# Patient Record
Sex: FEMALE | Race: BLACK OR AFRICAN AMERICAN | NOT HISPANIC OR LATINO | Employment: PART TIME | URBAN - METROPOLITAN AREA
[De-identification: names, ages, dates, MRNs, and addresses within clinical notes are randomized per-mention and may not be internally consistent; named-entity substitution may affect disease eponyms.]

---
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
## 2024-06-03 ENCOUNTER — LAB REQUISITION (OUTPATIENT)
Dept: LAB | Facility: CLINIC | Age: 19
End: 2024-06-03
Payer: COMMERCIAL

## 2024-06-03 DIAGNOSIS — L40.4 GUTTATE PSORIASIS: ICD-10-CM

## 2024-06-03 PROCEDURE — 87186 SC STD MICRODIL/AGAR DIL: CPT | Mod: ORL | Performed by: DERMATOLOGY

## 2024-06-06 LAB
BACTERIA SPEC CULT: ABNORMAL
BACTERIA SPEC CULT: ABNORMAL

## 2024-10-28 ENCOUNTER — TRANSFERRED RECORDS (OUTPATIENT)
Dept: HEALTH INFORMATION MANAGEMENT | Facility: CLINIC | Age: 19
End: 2024-10-28

## 2024-11-27 ENCOUNTER — OFFICE VISIT (OUTPATIENT)
Dept: FAMILY MEDICINE | Facility: CLINIC | Age: 19
End: 2024-11-27
Payer: COMMERCIAL

## 2024-11-27 ENCOUNTER — NURSE TRIAGE (OUTPATIENT)
Dept: INTERNAL MEDICINE | Facility: CLINIC | Age: 19
End: 2024-11-27

## 2024-11-27 VITALS
HEIGHT: 66 IN | WEIGHT: 98.1 LBS | TEMPERATURE: 97.9 F | DIASTOLIC BLOOD PRESSURE: 67 MMHG | BODY MASS INDEX: 15.77 KG/M2 | RESPIRATION RATE: 16 BRPM | HEART RATE: 60 BPM | OXYGEN SATURATION: 100 % | SYSTOLIC BLOOD PRESSURE: 108 MMHG

## 2024-11-27 DIAGNOSIS — F50.019 ANOREXIA NERVOSA, RESTRICTING TYPE, UNSPECIFIED SEVERITY: Primary | ICD-10-CM

## 2024-11-27 DIAGNOSIS — R00.2 PALPITATIONS: ICD-10-CM

## 2024-11-27 DIAGNOSIS — I95.1 ORTHOSTASIS: ICD-10-CM

## 2024-11-27 LAB
ANION GAP SERPL CALCULATED.3IONS-SCNC: 10 MMOL/L (ref 7–15)
BUN SERPL-MCNC: 10.7 MG/DL (ref 6–20)
CALCIUM SERPL-MCNC: 9.7 MG/DL (ref 8.8–10.4)
CHLORIDE SERPL-SCNC: 102 MMOL/L (ref 98–107)
CREAT SERPL-MCNC: 0.78 MG/DL (ref 0.51–0.95)
EGFRCR SERPLBLD CKD-EPI 2021: >90 ML/MIN/1.73M2
GLUCOSE SERPL-MCNC: 71 MG/DL (ref 70–99)
HCO3 SERPL-SCNC: 26 MMOL/L (ref 22–29)
HGB BLD-MCNC: 12.7 G/DL (ref 11.7–15.7)
POTASSIUM SERPL-SCNC: 4 MMOL/L (ref 3.4–5.3)
SODIUM SERPL-SCNC: 138 MMOL/L (ref 135–145)

## 2024-11-27 PROCEDURE — 85018 HEMOGLOBIN: CPT | Performed by: PHYSICIAN ASSISTANT

## 2024-11-27 PROCEDURE — 80048 BASIC METABOLIC PNL TOTAL CA: CPT | Performed by: PHYSICIAN ASSISTANT

## 2024-11-27 PROCEDURE — 36415 COLL VENOUS BLD VENIPUNCTURE: CPT | Performed by: PHYSICIAN ASSISTANT

## 2024-11-27 RX ORDER — DESVENLAFAXINE 50 MG/1
50 TABLET, FILM COATED, EXTENDED RELEASE ORAL DAILY
COMMUNITY

## 2024-11-27 ASSESSMENT — ENCOUNTER SYMPTOMS: EYE PAIN: 1

## 2024-11-27 ASSESSMENT — PATIENT HEALTH QUESTIONNAIRE - PHQ9
SUM OF ALL RESPONSES TO PHQ QUESTIONS 1-9: 20
SUM OF ALL RESPONSES TO PHQ QUESTIONS 1-9: 20
10. IF YOU CHECKED OFF ANY PROBLEMS, HOW DIFFICULT HAVE THESE PROBLEMS MADE IT FOR YOU TO DO YOUR WORK, TAKE CARE OF THINGS AT HOME, OR GET ALONG WITH OTHER PEOPLE: EXTREMELY DIFFICULT

## 2024-11-27 ASSESSMENT — PAIN SCALES - GENERAL: PAINLEVEL_OUTOF10: NO PAIN (0)

## 2024-11-27 NOTE — TELEPHONE ENCOUNTER
"1. SYMPTOM: \"What is the main symptom you are concerned about?\" (e.g., weakness, numbness)      Patient reports a 1.5 hour episode of blurry vision, pins and needles in the feet, and legs muscle spasms of both feet. These symptoms have resolved.  2. ONSET: \"When did this start?\" (minutes, hours, days; while sleeping)      One episode last night.   3. LAST NORMAL: \"When was the last time you (the patient) were normal (no symptoms)?\"      Never happened before.   4. PATTERN \"Does this come and go, or has it been constant since it started?\"  \"Is it present now?\"      One episode only last night.   5. OTHER SYMPTOMS: \"Do you have any other symptoms?\"       Some lightheadedness and dizziness today.     Patient ate and drank today, but generally has hard time eating enough food. Patient is requesting to be seen.     Appointments in Next Year      Nov 27, 2024 3:30 PM  (Arrive by 3:10 PM)  Provider Visit with Lauren Quintana PA-C  [facility] ([facility] ) [phone]             Reason for Disposition   Patient wants to be seen    Additional Information   Negative: Headache (with neurologic deficit)   Negative: Unable to urinate (or only a few drops) and bladder feels very full   Negative: Loss of bladder or bowel control (urine or bowel incontinence; wetting self, leaking stool) of new-onset   Negative: Back pain with numbness (loss of sensation) in groin or rectal area   Negative: Neurologic deficit that was brief (now gone), ANY of the following: * Weakness of the face, arm, or leg on one side of the body * Numbness of the face, arm, or leg on one side of the body * Loss of speech or garbled speech   Negative: Patient sounds very sick or weak to the triager   Negative: Neurologic deficit of gradual onset (e.g., days to weeks), ANY of the following: * Weakness of the face, arm, or leg on one side of the body* Numbness of the face, arm, or leg on one side of the " body* Loss of speech or garbled speech   Negative: Theresa palsy suspected (i.e., weakness only one side of the face, developing over hours to days, no other symptoms)   Negative: Tingling (e.g., pins and needles) of the face, arm or leg on one side of the body, that is present now  (Exceptions: Chronic or recurrent symptom lasting > 4 weeks; or from known cause, such as: bumped elbow, carpal tunnel, pinched nerve.)   Negative: Neck pain (with neurologic deficit)   Negative: Back pain (with neurologic deficit)    Protocols used: Neurologic Deficit-A-OH

## 2024-11-27 NOTE — PROGRESS NOTES
"Assessment and Plan:     (F50.019) Anorexia nervosa, restricting type, unspecified severity  (primary encounter diagnosis)  Comment: has been struggling with eating disorder for years, seeing psych and therapist, denies SI or HI, no drug use, hasn't eaten much at all today (yogurt and granola bar), does not want to go to treatment, lives with mom and she is supportive  Plan: Basic metabolic panel  (Ca, Cl, CO2, Creat,         Gluc, K, Na, BUN)        Continue therapy  Continue with psychiatry  Recommend seeing pcp in next month or so    (I95.1) Orthostasis  Comment: get lightheaded with palpitations when going from sitting to standing, denies sob, chest pain, was previously on fludrocortisone, admits to not drinking much water   Plan: Hemoglobin        Discussed poor po intake contributing to presentation and advised that she start eating small more frequent meals with emphasis on \"good\" fat and protein  Also advised she increase water intake    (R00.2) Palpitations  Comment: notices with position change only, no chest pain or sob  Plan: EKG 12-lead complete w/read - Clinics        Advised zio patch which she declined  See above re increasing po intake     YANELI [facility] Same Day Provider   30 minutes on the day of the encounter doing chart review, history and exam, documentation and further activities as noted above.      Jony Pascual is a 19 year old, presenting for the following health issues:  Eye Problem (Blurred vision)    Eye Problem     History of Present Illness       Reason for visit:  Heart rate rapidly increases when going from sitting to standing and an episode during which my feet were pins and needles and involuntarily jerking. I was also shaking, blurred vision, muscle spasms. I think disordered eating could be the cause.   She is taking medications regularly.         Has history of POTS  Has had some recent issues with heart racing when going from sitting to standing, she " "denies syncope but sometimes feels lightheaded like she might pass out   Has been off fludrocortisone for years  Does not drink much water  Has had yogurt, coffee and granola bar today  She denies heavy periods, BRB in stool, black stool    She also noticed some intermittent bilateral blurry vision last night, came and went for about an hour  At that time, had cramping of lower legs/feet    She is currently struggling with an eating disorder  Has had issues with this since she was 14 yo  She is seeing a therapist for her eating disorder   She sees psychiatrist in [facility], Dr. Ann (choices psychotherapy)    She denies chest pain, sob, leg swelling       She denies drug/etoh use     PHQ-9 alert noted in chart--she denies active SI or HI, she denies any intent to hurt herself     Lives with mom, no firearms in house     Objective      /67 (BP Location: Left arm, Patient Position: Sitting, Cuff Size: Adult Regular)   Pulse 60   Temp 97.9  F (36.6  C) (Tympanic)   Resp 16   Ht 1.664 m (5' 5.5\")   Wt 44.5 kg (98 lb 1.6 oz)   LMP  (LMP Unknown)   SpO2 100%   BMI 16.08 kg/m      Physical Exam     GENERAL: thin, in NAD  ENT: mmm, op clear   RESP: lungs clear to auscultation - no rales, no rhonchi, no wheezes  CV: regular rates and rhythm, normal S1 S2, no S3 or S4 and no murmur, no click or rub   MS: extremities- no gross deformities noted, no edema    Signed Electronically by: Lauren Quintana PA-C    "

## 2025-01-11 ENCOUNTER — HEALTH MAINTENANCE LETTER (OUTPATIENT)
Age: 20
End: 2025-01-11

## 2025-01-30 ENCOUNTER — OFFICE VISIT (OUTPATIENT)
Dept: FAMILY MEDICINE | Facility: CLINIC | Age: 20
End: 2025-01-30
Payer: COMMERCIAL

## 2025-01-30 VITALS
BODY MASS INDEX: 13.97 KG/M2 | DIASTOLIC BLOOD PRESSURE: 64 MMHG | HEART RATE: 100 BPM | HEIGHT: 66 IN | SYSTOLIC BLOOD PRESSURE: 110 MMHG | TEMPERATURE: 96.1 F | OXYGEN SATURATION: 99 % | WEIGHT: 86.9 LBS | RESPIRATION RATE: 14 BRPM

## 2025-01-30 DIAGNOSIS — Z00.00 ROUTINE GENERAL MEDICAL EXAMINATION AT A HEALTH CARE FACILITY: Primary | ICD-10-CM

## 2025-01-30 DIAGNOSIS — D72.819 LEUKOPENIA, UNSPECIFIED TYPE: ICD-10-CM

## 2025-01-30 DIAGNOSIS — E43 SEVERE PROTEIN-CALORIE MALNUTRITION: ICD-10-CM

## 2025-01-30 DIAGNOSIS — F60.3 BORDERLINE PERSONALITY DISORDER (H): ICD-10-CM

## 2025-01-30 DIAGNOSIS — Z11.59 NEED FOR HEPATITIS C SCREENING TEST: ICD-10-CM

## 2025-01-30 DIAGNOSIS — F33.1 MODERATE EPISODE OF RECURRENT MAJOR DEPRESSIVE DISORDER (H): ICD-10-CM

## 2025-01-30 DIAGNOSIS — Z11.3 SCREEN FOR STD (SEXUALLY TRANSMITTED DISEASE): ICD-10-CM

## 2025-01-30 DIAGNOSIS — R00.2 PALPITATIONS: ICD-10-CM

## 2025-01-30 DIAGNOSIS — I95.1 ORTHOSTASIS: ICD-10-CM

## 2025-01-30 DIAGNOSIS — G90.A POTS (POSTURAL ORTHOSTATIC TACHYCARDIA SYNDROME): ICD-10-CM

## 2025-01-30 DIAGNOSIS — R74.8 LOW SERUM ALKALINE PHOSPHATASE: ICD-10-CM

## 2025-01-30 DIAGNOSIS — Z11.4 SCREENING FOR HIV (HUMAN IMMUNODEFICIENCY VIRUS): ICD-10-CM

## 2025-01-30 DIAGNOSIS — F50.019 ANOREXIA NERVOSA, RESTRICTING TYPE, UNSPECIFIED SEVERITY: ICD-10-CM

## 2025-01-30 PROBLEM — Z86.59 HISTORY OF OCD (OBSESSIVE COMPULSIVE DISORDER): Status: ACTIVE | Noted: 2019-10-30

## 2025-01-30 PROBLEM — F41.1 GAD (GENERALIZED ANXIETY DISORDER): Status: ACTIVE | Noted: 2019-10-30

## 2025-01-30 LAB
ALBUMIN SERPL BCG-MCNC: 4.6 G/DL (ref 3.5–5.2)
ALP SERPL-CCNC: 53 U/L (ref 40–150)
ALT SERPL W P-5'-P-CCNC: 285 U/L (ref 0–70)
ANION GAP SERPL CALCULATED.3IONS-SCNC: 9 MMOL/L (ref 7–15)
AST SERPL W P-5'-P-CCNC: 165 U/L (ref 0–45)
BASOPHILS # BLD AUTO: 0 10E3/UL (ref 0–0.2)
BASOPHILS NFR BLD AUTO: 2 %
BILIRUB SERPL-MCNC: 0.6 MG/DL
BUN SERPL-MCNC: 20.2 MG/DL (ref 6–20)
CALCIUM SERPL-MCNC: 10.6 MG/DL (ref 8.8–10.4)
CHLORIDE SERPL-SCNC: 105 MMOL/L (ref 98–107)
CREAT SERPL-MCNC: 0.9 MG/DL (ref 0.51–1.17)
EGFRCR SERPLBLD CKD-EPI 2021: >90 ML/MIN/1.73M2
EOSINOPHIL # BLD AUTO: 0.1 10E3/UL (ref 0–0.7)
EOSINOPHIL NFR BLD AUTO: 2 %
ERYTHROCYTE [DISTWIDTH] IN BLOOD BY AUTOMATED COUNT: 13.4 % (ref 10–15)
FERRITIN SERPL-MCNC: 187 NG/ML (ref 6–409)
GLUCOSE SERPL-MCNC: 71 MG/DL (ref 70–99)
HCO3 SERPL-SCNC: 28 MMOL/L (ref 22–29)
HCT VFR BLD AUTO: 39.9 % (ref 35–53)
HCV AB SERPL QL IA: NONREACTIVE
HGB BLD-MCNC: 13.7 G/DL (ref 11.7–17.7)
HIV 1+2 AB+HIV1 P24 AG SERPL QL IA: NONREACTIVE
IMM GRANULOCYTES # BLD: 0 10E3/UL
IMM GRANULOCYTES NFR BLD: 0 %
LYMPHOCYTES # BLD AUTO: 1 10E3/UL (ref 0.8–5.3)
LYMPHOCYTES NFR BLD AUTO: 45 %
MAGNESIUM SERPL-MCNC: 2.3 MG/DL (ref 1.7–2.3)
MCH RBC QN AUTO: 29.3 PG (ref 26.5–33)
MCHC RBC AUTO-ENTMCNC: 34.3 G/DL (ref 31.5–36.5)
MCV RBC AUTO: 85 FL (ref 78–100)
MONOCYTES # BLD AUTO: 0.2 10E3/UL (ref 0–1.3)
MONOCYTES NFR BLD AUTO: 7 %
NEUTROPHILS # BLD AUTO: 1 10E3/UL (ref 1.6–8.3)
NEUTROPHILS NFR BLD AUTO: 44 %
NRBC # BLD AUTO: 0 10E3/UL
NRBC BLD AUTO-RTO: 0 /100
PHOSPHATE SERPL-MCNC: 3.5 MG/DL (ref 2.5–4.5)
PLATELET # BLD AUTO: 216 10E3/UL (ref 150–450)
POTASSIUM SERPL-SCNC: 4.4 MMOL/L (ref 3.4–5.3)
PROT SERPL-MCNC: 7.2 G/DL (ref 6.4–8.3)
RBC # BLD AUTO: 4.67 10E6/UL (ref 3.8–5.9)
SODIUM SERPL-SCNC: 142 MMOL/L (ref 135–145)
T4 FREE SERPL-MCNC: 0.85 NG/DL (ref 0.9–1.7)
TSH SERPL DL<=0.005 MIU/L-ACNC: 4.67 UIU/ML (ref 0.3–4.2)
VIT B12 SERPL-MCNC: 1976 PG/ML (ref 232–1245)
WBC # BLD AUTO: 2.2 10E3/UL (ref 4–11)
WBC # BLD AUTO: 2.2 10E3/UL (ref 4–11)

## 2025-01-30 SDOH — HEALTH STABILITY: PHYSICAL HEALTH: ON AVERAGE, HOW MANY MINUTES DO YOU ENGAGE IN EXERCISE AT THIS LEVEL?: 0 MIN

## 2025-01-30 SDOH — HEALTH STABILITY: PHYSICAL HEALTH: ON AVERAGE, HOW MANY DAYS PER WEEK DO YOU ENGAGE IN MODERATE TO STRENUOUS EXERCISE (LIKE A BRISK WALK)?: 0 DAYS

## 2025-01-30 ASSESSMENT — COLUMBIA-SUICIDE SEVERITY RATING SCALE - C-SSRS
5. IN THE PAST MONTH, HAVE YOU STARTED TO WORK OUT OR WORKED OUT THE DETAILS OF HOW TO KILL YOURSELF? DO YOU INTEND TO CARRY OUT THIS PLAN?: NO
1. WITHIN THE PAST MONTH, HAVE YOU WISHED YOU WERE DEAD OR WISHED YOU COULD GO TO SLEEP AND NOT WAKE UP?: YES
2. IN THE PAST MONTH, HAVE YOU ACTUALLY HAD ANY THOUGHTS OF KILLING YOURSELF?: YES
3. IN THE PAST MONTH, HAVE YOU BEEN THINKING ABOUT HOW YOU MIGHT KILL YOURSELF?: NO
6. HAVE YOU EVER DONE ANYTHING, STARTED TO DO ANYTHING, OR PREPARED TO DO ANYTHING TO END YOUR LIFE?: YES, LIFETIME
5. IN THE PAST MONTH, HAVE YOU STARTED TO WORK OUT OR WORKED OUT THE DETAILS OF HOW TO KILL YOURSELF? DO YOU INTEND TO CARRY OUT THIS PLAN?: NO

## 2025-01-30 ASSESSMENT — SOCIAL DETERMINANTS OF HEALTH (SDOH): HOW OFTEN DO YOU GET TOGETHER WITH FRIENDS OR RELATIVES?: NEVER

## 2025-01-30 ASSESSMENT — PATIENT HEALTH QUESTIONNAIRE - PHQ9
SUM OF ALL RESPONSES TO PHQ QUESTIONS 1-9: 24
10. IF YOU CHECKED OFF ANY PROBLEMS, HOW DIFFICULT HAVE THESE PROBLEMS MADE IT FOR YOU TO DO YOUR WORK, TAKE CARE OF THINGS AT HOME, OR GET ALONG WITH OTHER PEOPLE: EXTREMELY DIFFICULT
SUM OF ALL RESPONSES TO PHQ QUESTIONS 1-9: 24

## 2025-01-30 NOTE — PROGRESS NOTES
Preventive Care Visit  [facility] [place]  Yennifer Suly Fragoso PA-C, Family Medicine  Jan 30, 2025      Assessment & Plan     Routine general medical examination at a health care facility  - REVIEW OF HEALTH MAINTENANCE PROTOCOL ORDERS  - PRIMARY CARE FOLLOW-UP SCHEDULING    Borderline personality disorder (H)  Moderate episode of recurrent major depressive disorder (H)  Currently untreated.  Passive suicidal ideation.  Continues in psychotherapy.  Verbal contract for safety.  Patient declined medication initiation today.  Release of information signed for previous psychiatric providers.    Severe protein-calorie malnutrition  Anorexia nervosa, restricting type, unspecified severity  Voice significant concerns regarding patient's current weight and overall health status.  Offered assistance with formal treatment program from multi disciplinary clinic but patient adamantly refused.  Close monitoring of weight and if any further loss encouraged patient to reach out to the clinic so we can aid with referral/neck steps of care.  Encouraged high calorie, nutrient dense diet to prevent weight loss.  - CBC with platelets  - TSH with free T4 reflex  - Magnesium  - Phosphorus  - Ferritin  - Vitamin B12    POTS (postural orthostatic tachycardia syndrome)  Orthostasis  Palpitations  Historical condition.  Patient states that symptoms are stable.  Discussed the importance of hydration to mitigate dizziness and orthostatic symptoms.    Low serum alkaline phosphatase  Historical.  Likely multifactorial but anorexia likely contributory.  Labs for surveillance.  - Comprehensive metabolic panel (BMP + Alb, Alk Phos, ALT, AST, Total. Bili, TP)    Need for hepatitis C screening test  Routine screening  - Hepatitis C Screen Reflex to HCV RNA Quant and Genotype    Screening for HIV (human immunodeficiency virus)  Routine screening  - HIV Antigen Antibody Combo    Screen for STD (sexually transmitted disease)  Patient  denies any symptoms.  No new sexual partners in the last few months.  Not currently sexually active.  Unable to leave urine specimen today.    Leukopenia, unspecified type  Recent COVID-19 infection.  Certainly could be contributory.  Differential for further investigation.  - WBC with Diff      Patient has been advised of split billing requirements and indicates understanding: Yes        Depression Screening Follow Up        1/30/2025    12:40 AM   PHQ   PHQ-9 Total Score 24    Q9: Thoughts of better off dead/self-harm past 2 weeks Several days   F/U: Thoughts of suicide or self-harm No   F/U: Safety concerns No       Patient-reported         1/30/2025    12:40 AM   Last PHQ-9   1.  Little interest or pleasure in doing things 3   2.  Feeling down, depressed, or hopeless 3   3.  Trouble falling or staying asleep, or sleeping too much 3   4.  Feeling tired or having little energy 3   5.  Poor appetite or overeating 3   6.  Feeling bad about yourself 3   7.  Trouble concentrating 3   8.  Moving slowly or restless 2   Q9: Thoughts of better off dead/self-harm past 2 weeks 1   PHQ-9 Total Score 24    In the past two weeks have you had thoughts of suicide or self harm? No   Do you have concerns about your personal safety or the safety of others? No       Patient-reported               1/30/2025     6:47 PM   [facility] ([facility])   Within the last month, have you wished you were dead or wished you could go to sleep and not wake up? Yes   Within the last month, have you had any actual thoughts of killing yourself? Yes   Within the last month, have you been thinking about how you might do this? No   Within the last month, have you had these thoughts and had some intention of acting on them? No   Within the last month, have you started to work out or worked out the details of how to kill yourself with the intent to carry out this plan? No   Within the last month, have you ever done anything, started to do anything, or  prepared to do anything to end your life? Yes, lifetime              Follow Up Actions Taken  Crisis resource information provided in the After Visit Summary  Referred patient back to mental health provider    Discussed the following ways the patient can remain in a safe environment:  be around others  Counseling  Appropriate preventive services were addressed with this patient via screening, questionnaire, or discussion as appropriate for fall prevention, nutrition, physical activity, Tobacco-use cessation, social engagement, weight loss and cognition.  Checklist reviewing preventive services available has been given to the patient.  Reviewed patient's diet, addressing concerns and/or questions.   Patient is at risk for social isolation and has been provided with information about the benefit of social connection.   Samara is at risk for psychosocial distress and has been provided with information to reduce risk.   The patient's PHQ-9 score is consistent with severe depression. Samara was provided with information regarding depression.     Review of prior external note(s) from - [facility] information from [facility], and [facility]  reviewed  46 minutes spent by me on the date of the encounter doing chart review, history and exam, documentation and further activities per the note OUTSIDE OF PREVENTATIVE CARE    The longitudinal plan of care for the diagnosis(es)/condition(s) as documented were addressed during this visit. Due to the added complexity in care, I will continue to support Samara in the subsequent management and with ongoing continuity of care.    Return in about 3 months (around 4/30/2025) for RECHECK OF WEIGHT/LABS.      Yennifer Fragoso MBA, MS, PA-C  [facility]      Jony Pascual is a 19 year old, presenting for the following:  Physical        1/30/2025     1:32 PM   Additional Questions   Roomed by Nevin NEEL   Accompanied by Self        Via the [facility]  "Maintenance questionnaire, the patient has reported the following services have been completed -Chlamydia: [facility] 2024-01-27, this information has been sent to the abstraction team.    HPI    Patient reports that she is here today because \"her mother made her come \"to get checked out and see how her overall health is.  Currently at the lowest weight from her anorexia.  Feels lightheaded and weak often.    History of POTS  Diagnosed many years ago (approximately the age of 12)  Has been off fludrocortisone for years  Does not drink much water  Has had yogurt, coffee and granola bar today  She denies heavy periods, BRB in stool, black stool  Occasionally suffers from tachycardia and lightheadedness.  Most recent EKG November 2024 showed sinus bradycardia    Major depression/history of suicide attempts/insomnia  She is currently struggling with an eating disorder, major depression.  Per review of chart carries a diagnosis of borderline personality disorder.  Early 2024 was inpatient at [facility] for approximately 1 to 2 months.  Has had issues with this since she was 14 yo  She is seeing a therapist for her eating disorder   Previously has seen psychiatrist in [facility], Dr. Ann (choices psychotherapy) - stopped late 2024.  Sees psychotherapist virtually every 2 weeks ([facility]) - has been with this therapist for ~6 months.  The patient is not currently on any psychiatric medications after previously being on several, including Seroquel, Lexapro, Luvox, Hydroxyzine, Lamictal, Mirtazapine, Trazodone, and Geodon.  She is willing to sign a release of information to get records today.  Does not desire any psychiatric medications.  Has passive suicidal ideation but denies intent or plan.  She states that passive suicidal ideation is her \"normal \".         Anorexia  The patient had a history of struggling with anorexia since the age of 14, which improved over time but relapsed in August 2024 The relapse has led " to a significant weight loss from 98 pounds in November to 86 pounds currently, which the patient acknowledged as dangerous. 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 The patient is not currently on any psychiatric medications after previously being on several, including Seroquel, Lexapro, Luvox, Hydroxyzine, Lamictal, Mirtazapine, Trazodone, and Geodon. The patient reported experiencing insomnia and dizziness with some medications, and expressed a desire to get labs done to ensure overall health. Additionally, the patient dealt with bad insomnia but avoided medications like Trazodone due to dizziness. The patient reported a history of suicidal ideation but expressed no current intent or plan. The patient had not been working with any eating disorder clinics recently but had in the past and was hesitant to return at this time (believes this was both [facility] and [facility])    Wt Readings from Last 10 Encounters:   01/30/25 39.4 kg (86 lb 14.4 oz)   11/27/24 44.5 kg (98 lb 1.6 oz)   11/17/23 54.4 kg (120 lb) (34%, Z= -0.41)*   06/09/21 69.9 kg (154 lb 1.6 oz) (88%, Z= 1.17)*   06/04/21 65.8 kg (145 lb) (82%, Z= 0.91)*   05/22/16 41 kg (90 lb 6.4 oz) (41%, Z= -0.22)*   08/19/15 38.2 kg (84 lb 3.5 oz) (43%, Z= -0.16)*   08/17/15 38.1 kg (83 lb 15.9 oz) (43%, Z= -0.17)*   09/01/13 35.8 kg (79 lb) (76%, Z= 0.70)*     * Growth percentiles are based on CDC (Girls, 2-20 Years) data.     Health Care Directive  Patient does not have a Health Care Directive: Discussed advance care planning with patient; however, patient declined at this time.      1/30/2025   General Health   How would you rate your overall physical health? (!) DECLINE   Feel stress (tense, anxious, or unable to sleep) Very much   (!) STRESS CONCERN      1/30/2025   Nutrition   Three or more servings of calcium each day? (!) I  DON'T KNOW   Diet: Breakfast skipped   How many servings of fruit and vegetables per day? (!) 0-1   How many sweetened beverages each day? 0-1         1/30/2025   Exercise   Days per week of moderate/strenous exercise 0 days   Average minutes spent exercising at this level 0 min   (!) EXERCISE CONCERN      1/30/2025   Social Factors   Frequency of gathering with friends or relatives Never   Worry food won't last until get money to buy more No   Food not last or not have enough money for food? No   Do you have housing? (Housing is defined as stable permanent housing and does not include staying ouside in a car, in a tent, in an abandoned building, in an overnight shelter, or couch-surfing.) Yes   Are you worried about losing your housing? No   Lack of transportation? No   Unable to get utilities (heat,electricity)? No   (!) SOCIAL CONNECTIONS CONCERN      1/30/2025   Dental   Dentist two times every year? Yes         1/30/2025   TB Screening   Were you born outside of the [place]? No       Today's PHQ-9 Score:       1/30/2025    12:40 AM   PHQ-9 SCORE   PHQ-9 Total Score MyChart 24 (Severe depression)   PHQ-9 Total Score 24        Patient-reported         1/30/2025   Substance Use   Alcohol more than 3/day or more than 7/wk No   Do you use any other substances recreationally? (!) CANNABIS PRODUCTS     Social History     Tobacco Use    Smoking status: Never    Smokeless tobacco: Never    Tobacco comments:     NA   Vaping Use    Vaping status: Every Day    Substances: Nicotine   Substance Use Topics    Alcohol use: Not Currently     Comment: rare    Drug use: Yes     Types: Marijuana     Comment: daily             1/30/2025   One time HIV Screening   Previous HIV test? I don't know         1/30/2025   STI Screening   New sexual partner(s) since last STI/HIV test? No     History of abnormal Pap smear: No - age 21-29 PAP every 3 years recommended             1/30/2025   Contraception/Family Planning   Questions about  "contraception or family planning No        Reviewed and updated as needed this visit by Provider   Tobacco  Allergies  Meds  Problems  Med Hx  Surg Hx  Fam Hx  Soc   Hx Sexual Activity          History reviewed. No pertinent past medical history.  Past Surgical History:   Procedure Laterality Date    TILT TABLE PROCEDURE (IN CATH LAB) N/A 8/19/2015    Procedure: TILT TABLE PROCEDURE (IN CATH LAB);  Surgeon: Leeanna Foster MD;  Location: [facility] OR         Review of Systems  Constitutional, HEENT, cardiovascular, pulmonary, GI, , musculoskeletal, neuro, skin, endocrine and psych systems are negative, except as otherwise noted.     Objective    Exam  /64   Pulse 100   Temp (!) 96.1  F (35.6  C) (Tympanic)   Resp 14   Ht 1.674 m (5' 5.91\")   Wt 39.4 kg (86 lb 14.4 oz)   LMP 01/18/2024 (Approximate)   SpO2 99%   BMI 14.07 kg/m     Estimated body mass index is 14.07 kg/m  as calculated from the following:    Height as of this encounter: 1.674 m (5' 5.91\").    Weight as of this encounter: 39.4 kg (86 lb 14.4 oz).    Physical Exam  GENERAL: alert and no distress, cachectic appearance  EYES: Eyes grossly normal to inspection, PERRL and conjunctivae and sclerae normal  HENT: ear canals and TM's normal, nose and mouth without ulcers or lesions  NECK: no adenopathy, no asymmetry, masses, or scars  RESP: lungs clear to auscultation - no rales, rhonchi or wheezes  CV: regular rate and rhythm, normal S1 S2, no S3 or S4, no murmur, click or rub, no peripheral edema  ABDOMEN: soft, nontender, no hepatosplenomegaly, no masses and bowel sounds normal  MS: no gross musculoskeletal defects noted, no edema  SKIN: no suspicious lesions or rashes  NEURO: Normal strength and tone, mentation intact and speech normal  PSYCH: mentation appears normal, affect normal/bright  : Exam declined by parent/patient.  Reason for decline: Patient/Parental preference      Results for orders placed or performed in visit on " "01/30/25   CBC with platelets     Status: Abnormal   Result Value Ref Range    WBC Count 2.2 (L) 4.0 - 11.0 10e3/uL    RBC Count 4.67 3.80 - 5.90 10e6/uL    Hemoglobin 13.7 11.7 - 17.7 g/dL    Hematocrit 39.9 35.0 - 53.0 %    MCV 85 78 - 100 fL    MCH 29.3 26.5 - 33.0 pg    MCHC 34.3 31.5 - 36.5 g/dL    RDW 13.4 10.0 - 15.0 %    Platelet Count 216 150 - 450 10e3/uL    Narrative    Tech Comments  Name of Pandoodle Chacha  Laboratory Phone [phone]  What is Abnormal Leukocytopenia  Provider Follow Up Needed na  If Yes, Provider Contact Name na  If Yes, Provider Phone/Pager na       Sex Specific Reference Ranges:     Female  35.0-47.0 %   Male      40.0-53.0 %  The generation of reference intervals for this test is currently based on binary male or female sex. If the electronic health record information indicates another gender identity or if Legal Sex is recorded as \"Unknown\", both male and female reference intervals are provided where applicable, and should be considered according to the individual's appropriate clinical context.             Signed Electronically by: Yennifer Fragoso PA-C    "

## 2025-01-30 NOTE — PATIENT INSTRUCTIONS
Patient Education   Preventive Care Advice   This is general advice given by our system to help you stay healthy. However, your care team may have specific advice just for you. Please talk to your care team about your preventive care needs.  Nutrition  Eat 5 or more servings of fruits and vegetables each day.  Try wheat bread, brown rice and whole grain pasta (instead of white bread, rice, and pasta).  Get enough calcium and vitamin D. Check the label on foods and aim for 100% of the RDA (recommended daily allowance).  Lifestyle  Exercise at least 150 minutes each week  (30 minutes a day, 5 days a week).  Do muscle strengthening activities 2 days a week. These help control your weight and prevent disease.  No smoking.  Wear sunscreen to prevent skin cancer.  Have a dental exam and cleaning every 6 months.  Yearly exams  See your health care team every year to talk about:  Any changes in your health.  Any medicines your care team has prescribed.  Preventive care, family planning, and ways to prevent chronic diseases.  Shots (vaccines)   HPV shots (up to age 26), if you've never had them before.  Hepatitis B shots (up to age 59), if you've never had them before.  COVID-19 shot: Get this shot when it's due.  Flu shot: Get a flu shot every year.  Tetanus shot: Get a tetanus shot every 10 years.  Pneumococcal, hepatitis A, and RSV shots: Ask your care team if you need these based on your risk.  Shingles shot (for age 50 and up)  General health tests  Diabetes screening:  Starting at age 35, Get screened for diabetes at least every 3 years.  If you are younger than age 35, ask your care team if you should be screened for diabetes.  Cholesterol test: At age 39, start having a cholesterol test every 5 years, or more often if advised.  Bone density scan (DEXA): At age 50, ask your care team if you should have this scan for osteoporosis (brittle bones).  Hepatitis C: Get tested at least once in your life.  STIs (sexually  transmitted infections)  Before age 24: Ask your care team if you should be screened for STIs.  After age 24: Get screened for STIs if you're at risk. You are at risk for STIs (including HIV) if:  You are sexually active with more than one person.  You don't use condoms every time.  You or a partner was diagnosed with a sexually transmitted infection.  If you are at risk for HIV, ask about PrEP medicine to prevent HIV.  Get tested for HIV at least once in your life, whether you are at risk for HIV or not.  Cancer screening tests  Cervical cancer screening: If you have a cervix, begin getting regular cervical cancer screening tests starting at age 21.  Breast cancer scan (mammogram): If you've ever had breasts, begin having regular mammograms starting at age 40. This is a scan to check for breast cancer.  Colon cancer screening: It is important to start screening for colon cancer at age 45.  Have a colonoscopy test every 10 years (or more often if you're at risk) Or, ask your provider about stool tests like a FIT test every year or Cologuard test every 3 years.  To learn more about your testing options, visit:   .  For help making a decision, visit:   https://bit.ly/gr17681.  Prostate cancer screening test: If you have a prostate, ask your care team if a prostate cancer screening test (PSA) at age 55 is right for you.  Lung cancer screening: If you are a current or former smoker ages 50 to 80, ask your care team if ongoing lung cancer screenings are right for you.  For informational purposes only. Not to replace the advice of your health care provider. Copyright   2023 [facility]. All rights reserved. Clinically reviewed by the [facility]. [employer] 061243 - REV 01/24.  Learning About Stress  What is stress?     Stress is your body's response to a hard situation. Your body can have a physical, emotional, or mental response. Stress is a fact of life for most people, and it  affects everyone differently. What causes stress for you may not be stressful for someone else.  A lot of things can cause stress. You may feel stress when you go on a job interview, take a test, or run a race. This kind of short-term stress is normal and even useful. It can help you if you need to work hard or react quickly. For example, stress can help you finish an important job on time.  Long-term stress is caused by ongoing stressful situations or events. Examples of long-term stress include long-term health problems, ongoing problems at work, or conflicts in your family. Long-term stress can harm your health.  How does stress affect your health?  When you are stressed, your body responds as though you are in danger. It makes hormones that speed up your heart, make you breathe faster, and give you a burst of energy. This is called the fight-or-flight stress response. If the stress is over quickly, your body goes back to normal and no harm is done.  But if stress happens too often or lasts too long, it can have bad effects. Long-term stress can make you more likely to get sick, and it can make symptoms of some diseases worse. If you tense up when you are stressed, you may develop neck, shoulder, or low back pain. Stress is linked to high blood pressure and heart disease.  Stress also harms your emotional health. It can make you ty, tense, or depressed. Your relationships may suffer, and you may not do well at work or school.  What can you do to manage stress?  You can try these things to help manage stress:   Do something active. Exercise or activity can help reduce stress. Walking is a great way to get started. Even everyday activities such as housecleaning or yard work can help.  Try yoga or ryann chi. These techniques combine exercise and meditation. You may need some training at first to learn them.  Do something you enjoy. For example, listen to music or go to a movie. Practice your hobby or do volunteer  "work.  Meditate. This can help you relax, because you are not worrying about what happened before or what may happen in the future.  Do guided imagery. Imagine yourself in any setting that helps you feel calm. You can use online videos, books, or a teacher to guide you.  Do breathing exercises. For example:  From a standing position, bend forward from the waist with your knees slightly bent. Let your arms dangle close to the floor.  Breathe in slowly and deeply as you return to a standing position. Roll up slowly and lift your head last.  Hold your breath for just a few seconds in the standing position.  Breathe out slowly and bend forward from the waist.  Let your feelings out. Talk, laugh, cry, and express anger when you need to. Talking with supportive friends or family, a counselor, or a gibson leader about your feelings is a healthy way to relieve stress. Avoid discussing your feelings with people who make you feel worse.  Write. It may help to write about things that are bothering you. This helps you find out how much stress you feel and what is causing it. When you know this, you can find better ways to cope.  What can you do to prevent stress?  You might try some of these things to help prevent stress:  Manage your time. This helps you find time to do the things you want and need to do.  Get enough sleep. Your body recovers from the stresses of the day while you are sleeping.  Get support. Your family, friends, and community can make a difference in how you experience stress.  Limit your news feed. Avoid or limit time on social media or news that may make you feel stressed.  Do something active. Exercise or activity can help reduce stress. Walking is a great way to get started.  Where can you learn more?  Go to https://www.[employer].net/patiented  Enter N032 in the search box to learn more about \"Learning About Stress.\"  Current as of: October 24, 2023  Content Version: 14.3    2024 [employer]. "   Care instructions adapted under license by your healthcare professional. If you have questions about a medical condition or this instruction, always ask your healthcare professional. [employer] disclaims any warranty or liability for your use of this information.    Learning About Depression Screening  What is depression screening?  Depression screening is a way to see if you have depression symptoms. It may be done by a doctor or counselor. It's often part of a routine checkup. That's because your mental health is just as important as your physical health.  Depression is a mental health condition that affects how you feel, think, and act. You may:  Have less energy.  Lose interest in your daily activities.  Feel sad and grouchy for a long time.  Depression is very common. It affects people of all ages.  Many things can lead to depression. Some people become depressed after they have a stroke or find out they have a major illness like cancer or heart disease. The death of a loved one or a breakup may lead to depression. It can run in families. Most experts believe that a combination of inherited genes and stressful life events can cause it.  What happens during screening?  You may be asked to fill out a form about your depression symptoms. You and the doctor will discuss your answers. The doctor may ask you more questions to learn more about how you think, act, and feel.  What happens after screening?  If you have symptoms of depression, your doctor will talk to you about your options.  Doctors usually treat depression with medicines or counseling. Often, combining the two works best. Many people don't get help because they think that they'll get over the depression on their own. But people with depression may not get better unless they get treatment.  The cause of depression is not well understood. There may be many factors involved. But if you have depression, it's not your fault.  A serious symptom  "of depression is thinking about death or suicide. If you or someone you care about talks about this or about feeling hopeless, get help right away.  It's important to know that depression can be treated. Medicine, counseling, and self-care may help.  Where can you learn more?  Go to https://www.[employer].net/patiented  Enter T185 in the search box to learn more about \"Learning About Depression Screening.\"  Current as of: July 31, 2024  Content Version: 14.3    2024 [employer].   Care instructions adapted under license by your healthcare professional. If you have questions about a medical condition or this instruction, always ask your healthcare professional. [employer] disclaims any warranty or liability for your use of this information.    Substance Use Disorder: Care Instructions  Overview     You can improve your life and health by stopping your use of alcohol or drugs. When you don't drink or use drugs, you may feel and sleep better. You may get along better with your family, friends, and coworkers. There are medicines and programs that can help with substance use disorder.  How can you care for yourself at home?  Here are some ways to help you stay sober and prevent relapse.  If you have been given medicine to help keep you sober or reduce your cravings, be sure to take it exactly as prescribed.  Talk to your doctor about programs that can help you stop using drugs or drinking alcohol.  Do not keep alcohol or drugs in your home.  Plan ahead. Think about what you'll say if other people ask you to drink or use drugs. Try not to spend time with people who drink or use drugs.  Use the time and money spent on drinking or drugs to do something that's important to you.  Preventing a relapse  Have a plan to deal with relapse. Learn to recognize changes in your thinking that lead you to drink or use drugs. Get help before you start to drink or use drugs again.  Try to stay away from situations, " friends, or places that may lead you to drink or use drugs.  If you feel the need to drink alcohol or use drugs again, seek help right away. Call a trusted friend or family member. Some people get support from organizations such as Narcotics Anonymous or [employer] or from treatment facilities.  If you relapse, get help as soon as you can. Some people make a plan with another person that outlines what they want that person to do for them if they relapse. The plan usually includes how to handle the relapse and who to notify in case of relapse.  Don't give up. Remember that a relapse doesn't mean that you have failed. Use the experience to learn the triggers that lead you to drink or use drugs. Then quit again. Recovery is a lifelong process. Many people have several relapses before they are able to quit for good.  Follow-up care is a key part of your treatment and safety. Be sure to make and go to all appointments, and call your doctor if you are having problems. It's also a good idea to know your test results and keep a list of the medicines you take.  When should you call for help?   Call 176  anytime you think you may need emergency care. For example, call if you or someone else:    Has overdosed or has withdrawal signs. Be sure to tell the emergency workers that you are or someone else is using or trying to quit using drugs. Overdose or withdrawal signs may include:  Losing consciousness.  Seizure.  Seeing or hearing things that aren't there (hallucinations).     Is thinking or talking about suicide or harming others.   Where to get help 24 hours a day, 7 days a week   If you or someone you know talks about suicide, self-harm, a mental health crisis, a substance use crisis, or any other kind of emotional distress, get help right away. You can:    Call the Suicide and Crisis Lifeline at 118.     Call 6-701-418-TALK (1-387.377.8875).     Text HOME to 917449 to access the Crisis Text Line.   Consider saving  "these numbers in your phone.  Go to [employer] for more information or to chat online.  Call your doctor now or seek immediate medical care if:    You are having withdrawal symptoms. These may include nausea or vomiting, sweating, shakiness, and anxiety.   Watch closely for changes in your health, and be sure to contact your doctor if:    You have a relapse.     You need more help or support to stop.   Where can you learn more?  Go to https://www.[employer].net/patiented  Enter H573 in the search box to learn more about \"Substance Use Disorder: Care Instructions.\"  Current as of: November 15, 2023  Content Version: 14.3    2024 [employer].   Care instructions adapted under license by your healthcare professional. If you have questions about a medical condition or this instruction, always ask your healthcare professional. [employer] disclaims any warranty or liability for your use of this information.    [facility] Mobile Crisis Response Services  (contact the county where the person is physically located at the time of the crisis)  *[place] (Child and Adult):    [phone]  *[place] (Child and Adult):    [phone]  *[place] (Child and Adult):    [phone]  *[place] (Child):    [phone]    (Adult):    [phone]  *[place] (Child):    [phone]   (Adult):    [phone]  *[place] (Child and Adult):    [phone] (8-4:30 pm), [phone] (4:30 pm - 8 am)  *[place] (Child and Adult):    [phone]  Other Crisis Resources (non-mobile)  *[facility] (psychiatric emergency services)  [address] [place] [address]    *Acute Psychiatric Services (formerly Crisis Intervention Center):    [phone]    Walk-in and telephone crisis intervention services (focuses on >18 year-olds)    Located at [facility]      [address] [place] [address]  *Suicide Hotline:    " https://Karmasphereorg/ - call or text 988   Text Telephone:    988     *[facility] ( women and children):    [phone]  or  [phone]  *[place] ( women and children):    [phone]       Short-term Residential Crisis Resources  *[facility] (ages 10-17):  [phone]     [address] [place] [address]   *[facility] (Birth - 6 years):    [phone]  *[facility] (Birth - 12 years):    [phone]       Inpatient Hospitalization and Residential Evaluation  *[facility] (Child and Adult)     [address] [place] [address]    Inpatient Intake [phone]    [facility]al [facility] Center:    [phone]    Day Treatment/Behavioral Intake:    [phone]  *[facility] (Adult):    [phone]

## 2025-02-03 ENCOUNTER — TELEPHONE (OUTPATIENT)
Dept: FAMILY MEDICINE | Facility: CLINIC | Age: 20
End: 2025-02-03
Payer: COMMERCIAL

## 2025-02-03 NOTE — TELEPHONE ENCOUNTER
Triage: this patient's labs are indicative of severe malnutrition from anorexia.  Hospitalization is needed.  We can try to facilitate direct admission or she can go to the ER.  Please let  me know if I should try to get direct admission attempted (if no beds still may need to utilize ER).    Result note in chart:     Samara  I have reviewed your recent test results:    1. **Thyroid Function:** Your thyroid hormone levels are lower than normal, which can occur as a result of severe malnutrition.    2. **Liver Health:** Your liver enzymes are higher than expected, suggesting your liver might be under stress, potentially due to malnutrition.    3. **White Blood Cell Count:** Your white blood cell count is low, which might make it harder for your body to fight infections, often seen in cases of severe malnutrition.    4. **Calcium Level:** Your calcium level is slightly higher than normal, which can be related to your current nutritional state.    5. **Vitamin B12:** Your B12 level is higher than normal, but this is not an immediate concern.    6. **Body Mass Index (BMI):** As we discussed in the office, your BMI is quite low, indicating severe malnutrition.    All of these findings are likely related to severe malnutrition from anorexia. It is crucial to address these issues with close medical observation and support. Therefore, I recommend hospitalization to ensure you receive the necessary care to stabilize your health, including nutritional support and monitoring for any complications.    We can do this by trying to get you directly admitted to the hospital (if space is available) or by utilizing the emergency room to assist in facilitating the admission.  I will ask my nursing staff to contact you to determine how you would like to proceed.    For additional lab test information, [email or web address] is an excellent reference.     If you have any questions please do not hesitate to contact our office via phone  ([phone]) or Inspirist.    Healthy regards,     Yennifer Fragoso MBA, MS, PA-C  [facility]

## 2025-02-03 NOTE — TELEPHONE ENCOUNTER
Patient calling regarding her lab tests from 1/30/25.  Patient has concerns regarding some high numbers.    Please review labs and provide recommendations.

## 2025-02-03 NOTE — TELEPHONE ENCOUNTER
Called patient and explained note, patient states she reviewed the result note via appbackrt     Patient states she is undecided at this point wether she will go to er.     Explained rationale and risks associated with not seeking care     Patient states understanding   Explained will send response to provider